# Patient Record
Sex: FEMALE | Race: WHITE | NOT HISPANIC OR LATINO | Employment: FULL TIME | URBAN - METROPOLITAN AREA
[De-identification: names, ages, dates, MRNs, and addresses within clinical notes are randomized per-mention and may not be internally consistent; named-entity substitution may affect disease eponyms.]

---

## 2017-09-01 NOTE — TELEPHONE ENCOUNTER
----- Message from Liliam Bradshaw sent at 9/1/2017  3:16 PM CDT -----  Would like an patricioy pin called into .  [facility], [facility] - [place], [place] - [address] [place] [id number]  Phone: [phone] Fax: [phone]    Please call back at [phone]. thanks

## 2017-09-05 RX ORDER — EPINEPHRINE 0.3 MG/.3ML
1 INJECTION SUBCUTANEOUS ONCE
Qty: 2 EACH | Refills: 0 | Status: SHIPPED | OUTPATIENT
Start: 2017-09-05 | End: 2023-08-17

## 2021-05-10 ENCOUNTER — PATIENT MESSAGE (OUTPATIENT)
Dept: RESEARCH | Facility: HOSPITAL | Age: 41
End: 2021-05-10

## 2023-08-09 PROBLEM — Z80.3 FAMILY HISTORY OF MALIGNANT NEOPLASM OF BREAST: Status: ACTIVE | Noted: 2023-08-09

## 2023-08-10 NOTE — PROGRESS NOTES
[facility]  MD Augustin Quiles, NP-C    Chief Complaint:   Marci Gardner is a 43 y.o. female presenting today for  6 month follow up as part of our [facility]. She is due for MRI . Her MRI was done 7/19/23. She reports no interval changes on her self-breast examination.     History of Present Illness:   Mrs. Gardner first presented on August 31, 2020 due to her concerns of her future breast cancer risk. She has a family history that is worrisome. Her imaging has been normal. MyRisk Negative (2020). Her ALLIE was calculated in 2020 and found to give her a 32.7% Lifetime Risk of Breast Cancer. MD::: Jonna Mayberry NP.    Past Medical History:   Diagnosis Date    Anxiety     Depression     Family history of malignant neoplasm of breast 8/9/2023      Past Surgical History:   Procedure Laterality Date    BREAST SURGERY      ENDOMETRIAL ABLATION      TONSILLECTOMY          Current Outpatient Medications:     buPROPion (WELLBUTRIN) 100 MG tablet, Take 1 tablet (100 mg total) by mouth 2 (two) times daily., Disp: 20 tablet, Rfl: 0    epinephrine (EPIPEN 2-KARL) 0.3 mg/0.3 mL AtIn, Inject 0.3 mLs (0.3 mg total) into the muscle once., Disp: 2 each, Rfl: 0    ondansetron (ZOFRAN-ODT) 8 MG TbDL, Take 1 tablet (8 mg total) by mouth 3 (three) times daily as needed., Disp: 20 tablet, Rfl: 0    pantoprazole (PROTONIX) 40 MG tablet, Take 1 tablet (40 mg total) by mouth once daily., Disp: 30 tablet, Rfl: 0   Review of patient's allergies indicates:   Allergen Reactions    Penicillins Rash      Social History     Tobacco Use    Smoking status: Never    Smokeless tobacco: Not on file   Substance Use Topics    Alcohol use: Yes     Comment: rare      Family History   Problem Relation Age of Onset    No Known Problems Mother     No Known Problems Father     No Known Problems Sister     No Known Problems Daughter     No Known Problems Son     No Known Problems Sister     No  Known Problems Sister     No Known Problems Daughter         Review of Systems   Integumentary:  Negative for color change, rash, mole/lesion, breast mass, breast discharge and breast tenderness.   Breast: Negative for mass and tenderness       Physical Exam   HENT:   Head: Normocephalic.   Pulmonary/Chest: Right breast exhibits no inverted nipple, no mass, no nipple discharge, no skin change and no tenderness. Left breast exhibits no inverted nipple, no mass, no nipple discharge, no skin change and no tenderness. No breast swelling.   Genitourinary: No breast swelling.   Musculoskeletal: Lymphadenopathy:      Upper Body:      Right upper body: No supraclavicular or axillary adenopathy.      Left upper body: No supraclavicular or axillary adenopathy.     Neurological: She is alert.      MRI: 7/19/23 FINDINGS:  There is moderate background parenchymal enhancement bilaterally. The patient has undergone bilateral augmentation mammoplasty. Again identified are scattered subcentimeter enhancing foci in both breasts, not significantly changed when compared to the previous exams. No abnormal axillary or internal mammary lymphadenopathy is noted. Review of the visualized upper abdominal structures and chest wall demonstrates nothing unusual. IMPRESSION: No MRI evidence of malignancy on either side. The patient is due for annual mammogram in January 2024. BIRADS 2: BENIGN    Assessment/Plan  1. Family history of malignant neoplasm of breast  Assessment & Plan:  We reviewed our findings today and her questions were answered.  She understands that her imaging and exams have remained stable (and show nothing concerning).  She is comfortable being followed in a conservative fashion.      She understands the importance of monthly self-breast examination and knows to report any and all changes as they occur.  We discussed her family history and how it could impact her own future risks.  We discussed family vs. genetic history and  the importance and implications of each.  All questions answered to her satisfaction.  She knows that as additional family members are diagnosed - she will need to let us know as this may change follow up and imaging recommendations.    We had a discussion concerning Breast Cancer Risk Reduction and current NCCN Guidelines. She knows that her risk can be lowered slightly with a healthy lifestyle and minimal ETOH use. Being physically active will also help. She should reduce or stay away from OCPs and HRT as possible.                Medical Decision Making:  It is my impression that this patient suffers all conditions contained in this medical document.  Each of these conditions did affect our plan of care and my medical decision making today.  It is my opinion that the medical decision making concerning this patient was of moderate difficulty based on the aforementioned conditions.  Any further recommendations will be communicated to the patient by me.  I have reviewed and verified her allergies, list of medications, medical and surgical histories, social history, and a pertinent review of symptoms.      Follow up:  January 2024 and PRN    For:  EWELINA (CAROLINA) at

## 2023-08-10 NOTE — ASSESSMENT & PLAN NOTE
We reviewed our findings today and her questions were answered.  She understands that her imaging and exams have remained stable (and show nothing concerning).  She is comfortable being followed in a conservative fashion.      She understands the importance of monthly self-breast examination and knows to report any and all changes as they occur.  We discussed her family history and how it could impact her own future risks.  We discussed family vs. genetic history and the importance and implications of each.  All questions answered to her satisfaction.  She knows that as additional family members are diagnosed - she will need to let us know as this may change follow up and imaging recommendations.    We had a discussion concerning Breast Cancer Risk Reduction and current NCCN Guidelines. She knows that her risk can be lowered slightly with a healthy lifestyle and minimal ETOH use. Being physically active will also help. She should reduce or stay away from OCPs and HRT as possible.

## 2023-08-17 ENCOUNTER — OFFICE VISIT (OUTPATIENT)
Dept: SURGERY | Facility: CLINIC | Age: 43
End: 2023-08-17
Payer: COMMERCIAL

## 2023-08-17 DIAGNOSIS — Z80.3 FAMILY HISTORY OF MALIGNANT NEOPLASM OF BREAST: ICD-10-CM

## 2023-08-17 PROCEDURE — 1159F MED LIST DOCD IN RCRD: CPT | Mod: CPTII,S$GLB,, | Performed by: NURSE PRACTITIONER

## 2023-08-17 PROCEDURE — 99213 OFFICE O/P EST LOW 20 MIN: CPT | Mod: S$GLB,,, | Performed by: NURSE PRACTITIONER

## 2023-08-17 PROCEDURE — 99213 PR OFFICE/OUTPT VISIT, EST, LEVL III, 20-29 MIN: ICD-10-PCS | Mod: S$GLB,,, | Performed by: NURSE PRACTITIONER

## 2023-08-17 PROCEDURE — 1160F PR REVIEW ALL MEDS BY PRESCRIBER/CLIN PHARMACIST DOCUMENTED: ICD-10-PCS | Mod: CPTII,S$GLB,, | Performed by: NURSE PRACTITIONER

## 2023-08-17 PROCEDURE — 1160F RVW MEDS BY RX/DR IN RCRD: CPT | Mod: CPTII,S$GLB,, | Performed by: NURSE PRACTITIONER

## 2023-08-17 PROCEDURE — 1159F PR MEDICATION LIST DOCUMENTED IN MEDICAL RECORD: ICD-10-PCS | Mod: CPTII,S$GLB,, | Performed by: NURSE PRACTITIONER

## 2023-08-17 RX ORDER — KETOCONAZOLE 20 MG/ML
SHAMPOO, SUSPENSION TOPICAL EVERY OTHER DAY
COMMUNITY
Start: 2023-06-27

## 2024-01-16 DIAGNOSIS — Z80.3 FAMILY HISTORY OF MALIGNANT NEOPLASM OF BREAST: Primary | ICD-10-CM

## 2024-02-18 DIAGNOSIS — Z80.3 FAMILY HISTORY OF MALIGNANT NEOPLASM OF BREAST: Primary | ICD-10-CM

## 2024-02-18 NOTE — PROGRESS NOTES
[facility]  Adal Lagunas MD, FACS  Augustin Gómez NP-C      Date of Service: 2/26/2024    Chief Complaint:   Marci Gardner is a 43 y.o. female presenting today for her 6 month evaluation. She is due for her annual mammogram.  She reports no interval changes.     History of Present Illness:   Mrs. Gardner first presented on August 31, 2020 due to her concerns of her future breast cancer risk. She has a family history that is worrisome. Her imaging has been normal. MyRisk Negative (2020). Her ALLIE was calculated in 2020 and found to give her a 32.7% Lifetime Risk of Breast Cancer. MD::: Jonna Mayberry NP.     Past Medical History:   Diagnosis Date    Anxiety     BRCA negative 02/25/2024    Depression     Family history of malignant neoplasm of breast 08/09/2023      Past Surgical History:   Procedure Laterality Date    AUGMENTATION OF BREAST      ENDOMETRIAL ABLATION      TONSILLECTOMY          Current Outpatient Medications:     ketoconazole (NIZORAL) 2 % shampoo, Apply topically every other day., Disp: , Rfl:     semaglutide (OZEMPIC) 0.25 mg or 0.5 mg(2 mg/1.5 mL) pen injector, Inject into the skin., Disp: , Rfl:    Review of patient's allergies indicates:   Allergen Reactions    Penicillins Rash      Social History     Tobacco Use    Smoking status: Never    Smokeless tobacco: Not on file   Substance Use Topics    Alcohol use: Yes     Comment: rare      Family History   Problem Relation Age of Onset    No Known Problems Mother     No Known Problems Father     Breast cancer Sister 38    Breast cancer Sister 42    No Known Problems Sister     Breast cancer Paternal Grandmother 40 - 49    No Known Problems Daughter     No Known Problems Daughter     No Known Problems Son     Breast cancer Other         Review of Systems   Integumentary:  Negative for color change, rash, mole/lesion, breast mass, breast discharge and breast tenderness.   Breast: Negative for mass and  tenderness       Physical Exam   HENT:   Head: Normocephalic.   Pulmonary/Chest: Right breast exhibits no inverted nipple, no mass, no nipple discharge, no skin change and no tenderness. Left breast exhibits no inverted nipple, no mass, no nipple discharge, no skin change and no tenderness. No breast swelling.   Genitourinary: No breast swelling.   Musculoskeletal: Lymphadenopathy:      Upper Body:      Right upper body: No supraclavicular or axillary adenopathy.      Left upper body: No supraclavicular or axillary adenopathy.     Neurological: She is alert.        MAMMOGRAM REPORT: The breast tissue is extremely dense, which significantly lowers the sensitivity of mammography. The patient has undergone bilateral augmentation mammoplasty. There are no suspicious masses or suspicious calcifications seen to suggest malignancy. Scattered, circumscribed, benign-appearing masses are demonstrated.IMPRESSION: No evidence of malignancy. No significant change when compared to the previous exam. Follow-up mammography is recommended in one year.  ASSESSMENT and PLAN OF CARE     1. Family history of malignant neoplasm of breast  Assessment & Plan:  We reviewed our findings today and her questions were answered.  She understands that her imaging and exams have remained stable (and show nothing concerning).  She is comfortable being followed in a conservative fashion.      She understands the importance of monthly self-breast examination and knows to report any and all changes as they occur.  We discussed her family history and how it could impact her own future risks.  We discussed family vs. genetic history and the importance and implications of each.  All questions answered to her satisfaction.  She knows that as additional family members are diagnosed - she will need to let us know as this may change follow up and imaging recommendations.    We had a discussion concerning Breast Cancer Risk Reduction and current NCCN  Guidelines. She knows that her risk can be lowered slightly with a healthy lifestyle and minimal ETOH use. Being physically active will also help. She should reduce or stay away from OCPs and HRT as possible.         2. BRCA negative  Assessment & Plan:  We discussed the implications of her Negative Gene Test at length.  She knows that testing in the future may be necessary as this test will change as more genetic mutations are identified.  She understands that even though she is gene negative - that she can still develop a breast cancer.        Medical Decision Making: It is my impression that this patient suffers all conditions contained in this medical document.  Each of these conditions did affect our plan of care and my medical decision making today.  It is my opinion that the medical decision making concerning this patient was of minimal  difficulty based on the aforementioned conditions.  Any further recommendations will be communicated to the patient by me.  I have reviewed and verified her allergies, list of medications, medical and surgical histories, social history, and a pertinent review of symptoms.      Follow up:  6 months and prn    For:  MRI vs Ultrasound

## 2024-02-25 PROBLEM — Z13.71 BRCA NEGATIVE: Status: ACTIVE | Noted: 2024-02-25

## 2024-02-26 ENCOUNTER — OFFICE VISIT (OUTPATIENT)
Dept: SURGERY | Facility: CLINIC | Age: 44
End: 2024-02-26
Payer: COMMERCIAL

## 2024-02-26 DIAGNOSIS — Z80.3 FAMILY HISTORY OF MALIGNANT NEOPLASM OF BREAST: Primary | ICD-10-CM

## 2024-02-26 DIAGNOSIS — Z13.71 BRCA NEGATIVE: ICD-10-CM

## 2024-02-26 PROCEDURE — 1160F RVW MEDS BY RX/DR IN RCRD: CPT | Mod: CPTII,S$GLB,, | Performed by: NURSE PRACTITIONER

## 2024-02-26 PROCEDURE — 99213 OFFICE O/P EST LOW 20 MIN: CPT | Mod: S$GLB,,, | Performed by: NURSE PRACTITIONER

## 2024-02-26 PROCEDURE — 1159F MED LIST DOCD IN RCRD: CPT | Mod: CPTII,S$GLB,, | Performed by: NURSE PRACTITIONER

## 2024-02-26 PROCEDURE — 99999 PR PBB SHADOW E&M-EST. PATIENT-LVL II: CPT | Mod: PBBFAC,,, | Performed by: NURSE PRACTITIONER

## 2024-02-26 RX ORDER — LEVOTHYROXINE SODIUM 25 UG/1
1 TABLET ORAL EVERY MORNING
COMMUNITY
Start: 2024-02-09

## 2024-05-27 PROBLEM — Z13.71 BRCA NEGATIVE: Status: RESOLVED | Noted: 2024-02-25 | Resolved: 2024-05-27

## 2024-08-30 ENCOUNTER — TELEPHONE (OUTPATIENT)
Dept: SURGERY | Facility: CLINIC | Age: 44
End: 2024-08-30
Payer: COMMERCIAL

## 2024-08-30 NOTE — TELEPHONE ENCOUNTER
Pt. Updated on her MRI results. Due to scheduling issued her MRI was done  8/26/2024 and she will see me in Oct for an exam only.

## 2024-10-07 ENCOUNTER — OFFICE VISIT (OUTPATIENT)
Dept: SURGERY | Facility: CLINIC | Age: 44
End: 2024-10-07
Payer: COMMERCIAL

## 2024-10-07 DIAGNOSIS — Z80.3 FAMILY HISTORY OF MALIGNANT NEOPLASM OF BREAST: Primary | ICD-10-CM

## 2024-10-07 PROCEDURE — 1159F MED LIST DOCD IN RCRD: CPT | Mod: CPTII,S$GLB,, | Performed by: NURSE PRACTITIONER

## 2024-10-07 PROCEDURE — 99999 PR PBB SHADOW E&M-EST. PATIENT-LVL II: CPT | Mod: PBBFAC,,, | Performed by: NURSE PRACTITIONER

## 2024-10-07 PROCEDURE — 99213 OFFICE O/P EST LOW 20 MIN: CPT | Mod: S$GLB,,, | Performed by: NURSE PRACTITIONER

## 2024-10-07 PROCEDURE — 1160F RVW MEDS BY RX/DR IN RCRD: CPT | Mod: CPTII,S$GLB,, | Performed by: NURSE PRACTITIONER

## 2024-10-07 NOTE — PROGRESS NOTES
[facility]  Adal Lagunas MD, FACS  Augustin Gómez NP-C      Date of Service: 10/7/2024    Chief Complaint:   Marci Gardner is a 44 y.o. female presenting today for her 6 month evaluation. She is due for her annual Breast MRI.  She reports no interval changes. Her MRI was done 8/26/2024.    History of Present Illness:   Mrs. Gardner first presented on August 31, 2020 due to her concerns of her future breast cancer risk. She has a family history that is worrisome. Her imaging has been normal. MyRisk Negative (2020). Her ALLIE was calculated in 2020 and found to give her a 32.7% Lifetime Risk of Breast Cancer. MD::: Jonna Mayberry NP.     Past Medical History:   Diagnosis Date    Acquired hypothyroidism     Anxiety     BRCA negative 02/25/2024    Depression     Family history of malignant neoplasm of breast 08/09/2023    Thyroid goiter       Past Surgical History:   Procedure Laterality Date    AUGMENTATION OF BREAST      ENDOMETRIAL ABLATION      partial thyroidectomy 1/2/2024      TONSILLECTOMY          Current Outpatient Medications:     levothyroxine (SYNTHROID) 25 MCG tablet, Take 1 tablet by mouth every morning., Disp: , Rfl:     ketoconazole (NIZORAL) 2 % shampoo, Apply topically every other day., Disp: , Rfl:    Review of patient's allergies indicates:   Allergen Reactions    Penicillins Rash      Social History     Tobacco Use    Smoking status: Never    Smokeless tobacco: Never   Substance Use Topics    Alcohol use: Yes     Comment: rare      Family History   Problem Relation Name Age of Onset    No Known Problems Mother      No Known Problems Father      Breast cancer Sister Vaishali [address] 38    Breast cancer Sister Yakelin Welsh 42    No Known Problems Sister      Breast cancer Paternal Grandmother  40 - 49    No Known Problems Daughter      No Known Problems Daughter      No Known Problems Son      Breast cancer Other Mat great aunt         Review of Systems    Integumentary:  Negative for color change, rash, mole/lesion, breast mass, breast discharge and breast tenderness.   Breast: Negative for mass and tenderness       Physical Exam   HENT:   Head: Normocephalic.   Pulmonary/Chest: Right breast exhibits no inverted nipple, no mass, no nipple discharge, no skin change and no tenderness. Left breast exhibits no inverted nipple, no mass, no nipple discharge, no skin change and no tenderness. No breast swelling.   Genitourinary: No breast swelling.   Musculoskeletal: Lymphadenopathy:      Upper Body:      Right upper body: No supraclavicular or axillary adenopathy.      Left upper body: No supraclavicular or axillary adenopathy.     Neurological: She is alert.        MRI BREAST REPORT: 8/26/2024- FINDINGS: Extremely dense fibroglandular tissue signal intensity.   Marked bilateral nodular parenchymal background enhancement pattern, significantly increased since the most recent previous breast MRI. This does limit the sensitivity of the exam somewhat. Bilateral subpectoral breast implants again noted and appear grossly intact. No abnormal alisha implant fluid collections. Right Breast: No suspicious areas of enhancement within the right breast above the marked background enhancement pattern. No abnormal appearing axillary or internal mammary adenopathy. Left Breast: No suspicious areas of enhancement within the left breast above the marked background enhancement pattern. No abnormal appearing axillary or internal mammary adenopathy. Impression: No MRI findings to suggest malignancy. Interval development of  marked bilateral parenchymal background enhancement pattern since the previous MRI which does limit the sensitivity of the study somewhat.   Recommend routine mammographic and breast MRI follow-up according to ACR/NCCN guidelines in this high-risk patient.     ASSESSMENT and PLAN OF CARE     1. Family history of malignant neoplasm of breast  Assessment & Plan:  We reviewed  our findings today and her questions were answered.  She understands that her imaging and exams have remained stable (and show nothing concerning).  She is comfortable being followed in a conservative fashion.      She understands the importance of monthly self-breast examination and knows to report any and all changes as they occur.  We discussed her family history and how it could impact her own future risks.  We discussed family vs. genetic history and the importance and implications of each.  All questions answered to her satisfaction.  She knows that as additional family members are diagnosed - she will need to let us know as this may change follow up and imaging recommendations.    We had a discussion concerning Breast Cancer Risk Reduction and current NCCN Guidelines. She knows that her risk can be lowered slightly with a healthy lifestyle and minimal ETOH use. Being physically active will also help. She should reduce or stay away from OCPs and HRT as possible.         Medical Decision Making: It is my impression that this patient suffers all conditions contained in this medical document.  Each of these conditions did affect our plan of care and my medical decision making today.  It is my opinion that the medical decision making concerning this patient was of minimal  difficulty based on the aforementioned conditions.  Any further recommendations will be communicated to the patient by me.  I have reviewed and verified her allergies, list of medications, medical and surgical histories, social history, and a pertinent review of symptoms.     Follow up:  February 2025 and prn    For:  Physical Examination and MGM (D) at

## 2025-01-31 ENCOUNTER — PATIENT MESSAGE (OUTPATIENT)
Dept: SURGERY | Facility: CLINIC | Age: 45
End: 2025-01-31
Payer: COMMERCIAL

## 2025-02-20 NOTE — PROGRESS NOTES
[facility]  NEGIN Marion      Date of Service: 3/4/2025    Chief Complaint:   Marci Gardner is a 44 y.o. female presenting today for her 6 month evaluation. She is due for her annual mammogram.  She reports no interval changes.     History of Present Illness:   Mrs. Gardner first presented on August 31, 2020 due to her concerns of her future breast cancer risk. She has a family history that is worrisome. Her imaging has been normal. MyRisk Negative (2020). Her ALLIE was calculated in 2020 and found to give her a 32.7% Lifetime Risk of Breast Cancer. MD::: Julee Smith MD.     Past Medical History:   Diagnosis Date    Acquired hypothyroidism     Anxiety     BRCA negative 02/25/2024    Depression     Family history of malignant neoplasm of breast 08/09/2023    Thyroid goiter       Past Surgical History:   Procedure Laterality Date    AUGMENTATION OF BREAST      ENDOMETRIAL ABLATION      partial thyroidectomy 1/2/2024      TONSILLECTOMY        Current Medications[1]   Review of patient's allergies indicates:   Allergen Reactions    Penicillins Rash      Social History     Tobacco Use    Smoking status: Never    Smokeless tobacco: Never   Substance Use Topics    Alcohol use: Yes     Comment: rare      Family History   Problem Relation Name Age of Onset    No Known Problems Mother      No Known Problems Father      Breast cancer Sister Vaishali [address] 38    Breast cancer Sister Yakelin Welsh 42    No Known Problems Sister      Breast cancer Paternal Grandmother  40 - 49    No Known Problems Daughter      No Known Problems Daughter      No Known Problems Son      Breast cancer Other Mat great aunt         Review of Systems   Integumentary:  Negative for color change, rash, mole/lesion, breast mass, breast discharge and breast tenderness.   Breast: Negative for mass and tenderness       Physical Exam   HENT:   Head: Normocephalic.   Pulmonary/Chest: Right breast exhibits no  inverted nipple, no mass, no nipple discharge, no skin change and no tenderness. Left breast exhibits no inverted nipple, no mass, no nipple discharge, no skin change and no tenderness. No breast swelling.   Genitourinary: No breast swelling.   Musculoskeletal: Lymphadenopathy:      Upper Body:      Right upper body: No supraclavicular or axillary adenopathy.      Left upper body: No supraclavicular or axillary adenopathy.     Neurological: She is alert.        MAMMOGRAM REPORT: FINDINGS: The patient has had bilateral augmentation mammoplasty. The breast tissue is heterogeneously dense, which lowers the sensitivity of mammography. There are no suspicious masses or suspicious calcifications seen to suggest malignancy. IMPRESSION: No evidence of malignancy. No significant change when compared to previous exam. Follow-up mammography is recommended in one year.  ASSESSMENT and PLAN OF CARE     1. Family history of malignant neoplasm of breast  Assessment & Plan:  We reviewed our findings today and her questions were answered.  She understands that her imaging and exams have remained stable (and show nothing concerning).  She is comfortable being followed in a conservative fashion.      She understands the importance of monthly self-breast examination and knows to report any and all changes as they occur.  We discussed her family history and how it could impact her own future risks.  We discussed family vs. genetic history and the importance and implications of each.  All questions answered to her satisfaction.  She knows that as additional family members are diagnosed - she will need to let us know as this may change follow up and imaging recommendations.    We had a discussion concerning Breast Cancer Risk Reduction and current NCCN Guidelines. She knows that her risk can be lowered slightly with a healthy lifestyle and minimal ETOH use. Being physically active will also help. She should reduce or stay away from OCPs  and HRT as possible.         2. BRCA negative  Assessment & Plan:  We discussed the implications of her Negative Gene Test at length.  She knows that testing in the future may be necessary as this test will change as more genetic mutations are identified.  She understands that even though she is gene negative - that she can still develop a breast cancer.        Medical Decision Making: It is my impression that this patient suffers all conditions contained in this medical document.  Each of these conditions did affect our plan of care and my medical decision making today.  It is my opinion that the medical decision making concerning this patient was of minimal  difficulty based on the aforementioned conditions.  Any further recommendations will be communicated to the patient by me.  I have reviewed and verified her allergies, list of medications, medical and surgical histories, social history, and a pertinent review of symptoms.      Follow up:  6 months and prn    For:  Physical Examination and MRI             [1]   Current Outpatient Medications:     levothyroxine (SYNTHROID) 25 MCG tablet, Take 1 tablet by mouth every morning., Disp: , Rfl:

## 2025-02-25 ENCOUNTER — TELEPHONE (OUTPATIENT)
Dept: SURGERY | Facility: CLINIC | Age: 45
End: 2025-02-25
Payer: COMMERCIAL

## 2025-02-25 DIAGNOSIS — Z80.3 FAMILY HISTORY OF MALIGNANT NEOPLASM OF BREAST: Primary | ICD-10-CM

## 2025-03-04 ENCOUNTER — OFFICE VISIT (OUTPATIENT)
Dept: SURGERY | Facility: CLINIC | Age: 45
End: 2025-03-04
Payer: COMMERCIAL

## 2025-03-04 DIAGNOSIS — Z80.3 FAMILY HISTORY OF MALIGNANT NEOPLASM OF BREAST: Primary | ICD-10-CM

## 2025-03-04 DIAGNOSIS — Z13.71 BRCA NEGATIVE: ICD-10-CM

## 2025-03-04 PROCEDURE — 1160F RVW MEDS BY RX/DR IN RCRD: CPT | Mod: CPTII,S$GLB,, | Performed by: NURSE PRACTITIONER

## 2025-03-04 PROCEDURE — 1159F MED LIST DOCD IN RCRD: CPT | Mod: CPTII,S$GLB,, | Performed by: NURSE PRACTITIONER

## 2025-03-04 PROCEDURE — 99999 PR PBB SHADOW E&M-EST. PATIENT-LVL II: CPT | Mod: PBBFAC,,, | Performed by: NURSE PRACTITIONER

## 2025-03-04 PROCEDURE — 99213 OFFICE O/P EST LOW 20 MIN: CPT | Mod: S$GLB,,, | Performed by: NURSE PRACTITIONER
